# Patient Record
Sex: FEMALE | Race: BLACK OR AFRICAN AMERICAN | Employment: UNEMPLOYED | URBAN - METROPOLITAN AREA
[De-identification: names, ages, dates, MRNs, and addresses within clinical notes are randomized per-mention and may not be internally consistent; named-entity substitution may affect disease eponyms.]

---

## 2017-08-11 ENCOUNTER — HOSPITAL ENCOUNTER (OUTPATIENT)
Dept: MAMMOGRAPHY | Age: 57
Discharge: HOME | End: 2017-08-11
Attending: INTERNAL MEDICINE | Admitting: INTERNAL MEDICINE

## 2017-08-11 DIAGNOSIS — Z12.31 VISIT FOR SCREENING MAMMOGRAM: ICD-10-CM

## 2017-08-24 ENCOUNTER — OFFICE VISIT (OUTPATIENT)
Dept: ORTHOPEDIC SURGERY | Age: 57
End: 2017-08-24

## 2017-08-24 VITALS
DIASTOLIC BLOOD PRESSURE: 80 MMHG | WEIGHT: 167 LBS | HEART RATE: 62 BPM | SYSTOLIC BLOOD PRESSURE: 127 MMHG | BODY MASS INDEX: 28.51 KG/M2 | HEIGHT: 64 IN

## 2017-08-24 DIAGNOSIS — S61.412A LACERATION OF LEFT HAND WITHOUT FOREIGN BODY, INITIAL ENCOUNTER: Primary | ICD-10-CM

## 2017-08-24 PROCEDURE — 99203 OFFICE O/P NEW LOW 30 MIN: CPT | Performed by: ORTHOPAEDIC SURGERY

## 2017-09-18 ENCOUNTER — OFFICE VISIT (OUTPATIENT)
Dept: ORTHOPEDIC SURGERY | Age: 57
End: 2017-09-18

## 2017-09-18 VITALS — BODY MASS INDEX: 28.51 KG/M2 | HEIGHT: 64 IN | WEIGHT: 167 LBS | RESPIRATION RATE: 16 BRPM

## 2017-09-18 DIAGNOSIS — S61.412A LACERATION OF LEFT HAND WITHOUT FOREIGN BODY, INITIAL ENCOUNTER: Primary | ICD-10-CM

## 2017-09-18 PROCEDURE — 99212 OFFICE O/P EST SF 10 MIN: CPT | Performed by: ORTHOPAEDIC SURGERY

## 2021-11-01 NOTE — PROGRESS NOTES
Patient not reached. Preop instructions left on voice mail. [id number]_[phone]______________    -Date_11/03/21______time_0915______arrival______0745 MASC______  -Nothing to eat or drink after midnight  -Responsible adult 25 or older to stay on site while you are here and drive you home and stay with you after  -Follow any instructions your doctors office has given you  -Bring a complete list of all your medications and supplements  -If you normally take the following medications in the morning please do so with a small    sip of water-heart,blood pressure,seizure,breathing or thyroid-avoid water pilll Do not take blood pressure medications ending in \"luther\" or \"pril\" the AM of surgery or the nhung prior  -You may use your inhalers  -Take half of your normal dose of any long acting insulins the night before-do not take    any diabetic medications in the morning  -Follow your doctors instructions regarding blood thinners  -Any questions call your surgeons office      COVID TEST        __X_ Covid test to be done 3-5 days prior to scheduled surgery -patient aware they are REQUIRED to bring a copy of the negative test result DOS-if they receive a positive result to notify their surgeon          If known-indicate where patient is getting test done          ________________________________________    ___ Rapid - DOS    ___ Other _____________________________      [facility] POLICY(subject to change)    There is a one visitor policy at [facility] for all surgeries and endoscopies. Whether the visitor can stay or will be asked to wait in the car will depend on the current policy and if social distancing can be maintained. The policy is subject to change at any time. Please make sure the visitor has a cell phone that is on,charged and able to accept calls, as this may be the way that the staff communicates with them. Pain management is NO VISITOR policyThe patients ride is expected to remain in the car with a cell phone for communication. If the ride is leaving the hospital grounds please make sure they are back in time for pickup. Have the patient inform the staff on arrival what their rides plans are while the patient is in the facility. At the MAIN there is one visitor allowed. Please note that the visitor policy is subject to change.

## 2021-11-03 ENCOUNTER — HOSPITAL ENCOUNTER (OUTPATIENT)
Age: 61
Setting detail: OUTPATIENT SURGERY
Discharge: HOME | End: 2021-11-03
Attending: PODIATRIST | Admitting: PODIATRIST
Payer: COMMERCIAL

## 2021-11-03 ENCOUNTER — ANESTHESIA EVENT (OUTPATIENT)
Dept: OPERATING ROOM | Age: 61
End: 2021-11-03
Payer: COMMERCIAL

## 2021-11-03 ENCOUNTER — ANESTHESIA (OUTPATIENT)
Dept: OPERATING ROOM | Age: 61
End: 2021-11-03
Payer: COMMERCIAL

## 2021-11-03 ENCOUNTER — APPOINTMENT (OUTPATIENT)
Dept: GENERAL RADIOLOGY | Age: 61
End: 2021-11-03
Attending: PODIATRIST
Payer: COMMERCIAL

## 2021-11-03 VITALS
RESPIRATION RATE: 6 BRPM | TEMPERATURE: 97.3 F | SYSTOLIC BLOOD PRESSURE: 107 MMHG | OXYGEN SATURATION: 100 % | DIASTOLIC BLOOD PRESSURE: 69 MMHG

## 2021-11-03 VITALS
DIASTOLIC BLOOD PRESSURE: 105 MMHG | BODY MASS INDEX: 27.14 KG/M2 | WEIGHT: 159 LBS | TEMPERATURE: 98 F | HEIGHT: 64 IN | HEART RATE: 88 BPM | SYSTOLIC BLOOD PRESSURE: 163 MMHG | OXYGEN SATURATION: 93 % | RESPIRATION RATE: 16 BRPM

## 2021-11-03 PROBLEM — G80.9 CEREBRAL PALSY (HCC): Status: ACTIVE | Noted: 2021-11-03

## 2021-11-03 PROCEDURE — 73620 X-RAY EXAM OF FOOT: CPT

## 2021-11-03 PROCEDURE — 6360000002 HC RX W HCPCS: Performed by: PODIATRIST

## 2021-11-03 PROCEDURE — 2709999900 HC NON-CHARGEABLE SUPPLY: Performed by: PODIATRIST

## 2021-11-03 PROCEDURE — 7100000011 HC PHASE II RECOVERY - ADDTL 15 MIN: Performed by: PODIATRIST

## 2021-11-03 PROCEDURE — C1713 ANCHOR/SCREW BN/BN,TIS/BN: HCPCS | Performed by: PODIATRIST

## 2021-11-03 PROCEDURE — 2580000003 HC RX 258: Performed by: PODIATRIST

## 2021-11-03 PROCEDURE — 6360000002 HC RX W HCPCS: Performed by: NURSE ANESTHETIST, CERTIFIED REGISTERED

## 2021-11-03 PROCEDURE — C9359 IMPLNT,BON VOID FILLER-PUTTY: HCPCS | Performed by: PODIATRIST

## 2021-11-03 PROCEDURE — 6360000002 HC RX W HCPCS: Performed by: ANESTHESIOLOGY

## 2021-11-03 PROCEDURE — 7100000000 HC PACU RECOVERY - FIRST 15 MIN: Performed by: PODIATRIST

## 2021-11-03 PROCEDURE — 2720000010 HC SURG SUPPLY STERILE: Performed by: PODIATRIST

## 2021-11-03 PROCEDURE — 3700000000 HC ANESTHESIA ATTENDED CARE: Performed by: PODIATRIST

## 2021-11-03 PROCEDURE — 3700000001 HC ADD 15 MINUTES (ANESTHESIA): Performed by: PODIATRIST

## 2021-11-03 PROCEDURE — 64445 NJX AA&/STRD SCIATIC NRV IMG: CPT | Performed by: ANESTHESIOLOGY

## 2021-11-03 PROCEDURE — 3600000014 HC SURGERY LEVEL 4 ADDTL 15MIN: Performed by: PODIATRIST

## 2021-11-03 PROCEDURE — 7100000001 HC PACU RECOVERY - ADDTL 15 MIN: Performed by: PODIATRIST

## 2021-11-03 PROCEDURE — 3600000004 HC SURGERY LEVEL 4 BASE: Performed by: PODIATRIST

## 2021-11-03 PROCEDURE — 7100000010 HC PHASE II RECOVERY - FIRST 15 MIN: Performed by: PODIATRIST

## 2021-11-03 PROCEDURE — 2500000003 HC RX 250 WO HCPCS: Performed by: PODIATRIST

## 2021-11-03 PROCEDURE — 3209999900 FLUORO FOR SURGICAL PROCEDURES

## 2021-11-03 PROCEDURE — 2500000003 HC RX 250 WO HCPCS: Performed by: NURSE ANESTHETIST, CERTIFIED REGISTERED

## 2021-11-03 DEVICE — 3.5 X 14 MM R3CON LOCKING PLATE SCREW
Type: IMPLANTABLE DEVICE | Site: TOES | Status: FUNCTIONAL
Brand: GORILLA PLATING SYSTEM

## 2021-11-03 DEVICE — 3.5 X 16 MM R3CON NON-LOCKING PLATE SCREW
Type: IMPLANTABLE DEVICE | Site: TOES | Status: FUNCTIONAL
Brand: GORILLA PLATING SYSTEM

## 2021-11-03 DEVICE — GRAFT BNE 5ML DEMIN BNE MTRX PTY INTERGRO: Type: IMPLANTABLE DEVICE | Site: TOES | Status: FUNCTIONAL

## 2021-11-03 DEVICE — MTP, 0 DEGREE, MEDIUM PLATE, RIGHT
Type: IMPLANTABLE DEVICE | Site: TOES | Status: FUNCTIONAL
Brand: GORILLA PLATING SYSTEM

## 2021-11-03 DEVICE — 3.5 X 10 MM R3CON LOCKING PLATE SCREW
Type: IMPLANTABLE DEVICE | Site: TOES | Status: FUNCTIONAL
Brand: GORILLA PLATING SYSTEM

## 2021-11-03 RX ORDER — KETOROLAC TROMETHAMINE 30 MG/ML
INJECTION, SOLUTION INTRAMUSCULAR; INTRAVENOUS PRN
Status: DISCONTINUED | OUTPATIENT
Start: 2021-11-03 | End: 2021-11-03 | Stop reason: SDUPTHER

## 2021-11-03 RX ORDER — MEPERIDINE HYDROCHLORIDE 25 MG/ML
12.5 INJECTION INTRAMUSCULAR; INTRAVENOUS; SUBCUTANEOUS EVERY 5 MIN PRN
Status: DISCONTINUED | OUTPATIENT
Start: 2021-11-03 | End: 2021-11-03 | Stop reason: HOSPADM

## 2021-11-03 RX ORDER — SERTRALINE HYDROCHLORIDE 100 MG/1
100 TABLET, FILM COATED ORAL DAILY
COMMUNITY

## 2021-11-03 RX ORDER — PHENYLEPHRINE HCL IN 0.9% NACL 1 MG/10 ML
SYRINGE (ML) INTRAVENOUS PRN
Status: DISCONTINUED | OUTPATIENT
Start: 2021-11-03 | End: 2021-11-03 | Stop reason: SDUPTHER

## 2021-11-03 RX ORDER — ONDANSETRON 2 MG/ML
4 INJECTION INTRAMUSCULAR; INTRAVENOUS
Status: DISCONTINUED | OUTPATIENT
Start: 2021-11-03 | End: 2021-11-03 | Stop reason: HOSPADM

## 2021-11-03 RX ORDER — LABETALOL HYDROCHLORIDE 5 MG/ML
5 INJECTION, SOLUTION INTRAVENOUS EVERY 10 MIN PRN
Status: DISCONTINUED | OUTPATIENT
Start: 2021-11-03 | End: 2021-11-03 | Stop reason: HOSPADM

## 2021-11-03 RX ORDER — ROPIVACAINE HYDROCHLORIDE 5 MG/ML
INJECTION, SOLUTION EPIDURAL; INFILTRATION; PERINEURAL
Status: COMPLETED | OUTPATIENT
Start: 2021-11-03 | End: 2021-11-03

## 2021-11-03 RX ORDER — ATORVASTATIN CALCIUM 80 MG/1
80 TABLET, FILM COATED ORAL DAILY
COMMUNITY

## 2021-11-03 RX ORDER — FENTANYL CITRATE 50 UG/ML
50 INJECTION, SOLUTION INTRAMUSCULAR; INTRAVENOUS ONCE
Status: COMPLETED | OUTPATIENT
Start: 2021-11-03 | End: 2021-11-03

## 2021-11-03 RX ORDER — ACETAMINOPHEN 650 MG
TABLET, EXTENDED RELEASE ORAL
Status: COMPLETED | OUTPATIENT
Start: 2021-11-03 | End: 2021-11-03

## 2021-11-03 RX ORDER — LIDOCAINE HYDROCHLORIDE 20 MG/ML
INJECTION, SOLUTION EPIDURAL; INFILTRATION; INTRACAUDAL; PERINEURAL PRN
Status: DISCONTINUED | OUTPATIENT
Start: 2021-11-03 | End: 2021-11-03 | Stop reason: SDUPTHER

## 2021-11-03 RX ORDER — SODIUM CHLORIDE 9 MG/ML
INJECTION, SOLUTION INTRAVENOUS CONTINUOUS
Status: DISCONTINUED | OUTPATIENT
Start: 2021-11-03 | End: 2021-11-03 | Stop reason: HOSPADM

## 2021-11-03 RX ORDER — PROPOFOL 10 MG/ML
INJECTION, EMULSION INTRAVENOUS PRN
Status: DISCONTINUED | OUTPATIENT
Start: 2021-11-03 | End: 2021-11-03 | Stop reason: SDUPTHER

## 2021-11-03 RX ORDER — PROMETHAZINE HYDROCHLORIDE 25 MG/ML
6.25 INJECTION, SOLUTION INTRAMUSCULAR; INTRAVENOUS
Status: DISCONTINUED | OUTPATIENT
Start: 2021-11-03 | End: 2021-11-03 | Stop reason: HOSPADM

## 2021-11-03 RX ORDER — MAGNESIUM SULFATE HEPTAHYDRATE 500 MG/ML
INJECTION, SOLUTION INTRAMUSCULAR; INTRAVENOUS PRN
Status: DISCONTINUED | OUTPATIENT
Start: 2021-11-03 | End: 2021-11-03 | Stop reason: SDUPTHER

## 2021-11-03 RX ORDER — LEVOTHYROXINE SODIUM 0.12 MG/1
125 TABLET ORAL DAILY
COMMUNITY

## 2021-11-03 RX ORDER — MIDAZOLAM HYDROCHLORIDE 2 MG/2ML
1 INJECTION, SOLUTION INTRAMUSCULAR; INTRAVENOUS ONCE
Status: COMPLETED | OUTPATIENT
Start: 2021-11-03 | End: 2021-11-03

## 2021-11-03 RX ORDER — BUPIVACAINE HYDROCHLORIDE 5 MG/ML
INJECTION, SOLUTION EPIDURAL; INTRACAUDAL
Status: COMPLETED | OUTPATIENT
Start: 2021-11-03 | End: 2021-11-03

## 2021-11-03 RX ORDER — GLYCOPYRROLATE 1 MG/5 ML
SYRINGE (ML) INTRAVENOUS PRN
Status: DISCONTINUED | OUTPATIENT
Start: 2021-11-03 | End: 2021-11-03 | Stop reason: SDUPTHER

## 2021-11-03 RX ORDER — ONDANSETRON 2 MG/ML
INJECTION INTRAMUSCULAR; INTRAVENOUS PRN
Status: DISCONTINUED | OUTPATIENT
Start: 2021-11-03 | End: 2021-11-03 | Stop reason: SDUPTHER

## 2021-11-03 RX ORDER — LOSARTAN POTASSIUM 50 MG/1
50 TABLET ORAL DAILY
COMMUNITY

## 2021-11-03 RX ORDER — NIFEDIPINE 30 MG/1
60 TABLET, FILM COATED, EXTENDED RELEASE ORAL DAILY
COMMUNITY

## 2021-11-03 RX ORDER — HYDROMORPHONE HCL 110MG/55ML
PATIENT CONTROLLED ANALGESIA SYRINGE INTRAVENOUS PRN
Status: DISCONTINUED | OUTPATIENT
Start: 2021-11-03 | End: 2021-11-03 | Stop reason: SDUPTHER

## 2021-11-03 RX ORDER — HYDROMORPHONE HCL 110MG/55ML
0.25 PATIENT CONTROLLED ANALGESIA SYRINGE INTRAVENOUS EVERY 5 MIN PRN
Status: DISCONTINUED | OUTPATIENT
Start: 2021-11-03 | End: 2021-11-03 | Stop reason: HOSPADM

## 2021-11-03 RX ORDER — 0.9 % SODIUM CHLORIDE 0.9 %
500 INTRAVENOUS SOLUTION INTRAVENOUS
Status: DISCONTINUED | OUTPATIENT
Start: 2021-11-03 | End: 2021-11-03 | Stop reason: HOSPADM

## 2021-11-03 RX ORDER — HYDROMORPHONE HCL 110MG/55ML
0.5 PATIENT CONTROLLED ANALGESIA SYRINGE INTRAVENOUS EVERY 5 MIN PRN
Status: DISCONTINUED | OUTPATIENT
Start: 2021-11-03 | End: 2021-11-03 | Stop reason: HOSPADM

## 2021-11-03 RX ORDER — DEXAMETHASONE SODIUM PHOSPHATE 4 MG/ML
INJECTION, SOLUTION INTRA-ARTICULAR; INTRALESIONAL; INTRAMUSCULAR; INTRAVENOUS; SOFT TISSUE PRN
Status: DISCONTINUED | OUTPATIENT
Start: 2021-11-03 | End: 2021-11-03 | Stop reason: SDUPTHER

## 2021-11-03 RX ORDER — HYDROCHLOROTHIAZIDE 25 MG/1
25 TABLET ORAL DAILY
COMMUNITY

## 2021-11-03 RX ADMIN — HYDROMORPHONE HYDROCHLORIDE 0.5 MG: 2 INJECTION, SOLUTION INTRAMUSCULAR; INTRAVENOUS; SUBCUTANEOUS at 09:39

## 2021-11-03 RX ADMIN — PROPOFOL 120 MG: 10 INJECTION, EMULSION INTRAVENOUS at 09:33

## 2021-11-03 RX ADMIN — MIDAZOLAM 1 MG: 1 INJECTION INTRAMUSCULAR; INTRAVENOUS at 08:42

## 2021-11-03 RX ADMIN — FENTANYL CITRATE 50 MCG: 50 INJECTION INTRAMUSCULAR; INTRAVENOUS at 08:41

## 2021-11-03 RX ADMIN — Medication 0.2 MG: at 09:33

## 2021-11-03 RX ADMIN — MAGNESIUM SULFATE HEPTAHYDRATE 1 G: 500 INJECTION, SOLUTION INTRAMUSCULAR; INTRAVENOUS at 09:38

## 2021-11-03 RX ADMIN — Medication 100 MCG: at 10:13

## 2021-11-03 RX ADMIN — DEXAMETHASONE SODIUM PHOSPHATE 8 MG: 4 INJECTION, SOLUTION INTRAMUSCULAR; INTRAVENOUS at 09:38

## 2021-11-03 RX ADMIN — CEFAZOLIN 2000 MG: 10 INJECTION, POWDER, FOR SOLUTION INTRAVENOUS at 09:29

## 2021-11-03 RX ADMIN — Medication 200 MCG: at 09:52

## 2021-11-03 RX ADMIN — LIDOCAINE HYDROCHLORIDE 50 MG: 20 INJECTION, SOLUTION EPIDURAL; INFILTRATION; INTRACAUDAL; PERINEURAL at 09:33

## 2021-11-03 RX ADMIN — SODIUM CHLORIDE: 9 INJECTION, SOLUTION INTRAVENOUS at 10:26

## 2021-11-03 RX ADMIN — SODIUM CHLORIDE: 9 INJECTION, SOLUTION INTRAVENOUS at 08:16

## 2021-11-03 RX ADMIN — KETOROLAC TROMETHAMINE 30 MG: 60 INJECTION, SOLUTION INTRAMUSCULAR at 10:21

## 2021-11-03 RX ADMIN — ROPIVACAINE HYDROCHLORIDE 20 ML: 5 INJECTION, SOLUTION EPIDURAL; INFILTRATION; PERINEURAL at 08:50

## 2021-11-03 RX ADMIN — ONDANSETRON 4 MG: 2 INJECTION INTRAMUSCULAR; INTRAVENOUS at 10:21

## 2021-11-03 ASSESSMENT — PULMONARY FUNCTION TESTS
PIF_VALUE: 16
PIF_VALUE: 1
PIF_VALUE: 16
PIF_VALUE: 17
PIF_VALUE: 16
PIF_VALUE: 0
PIF_VALUE: 20
PIF_VALUE: 16
PIF_VALUE: 17
PIF_VALUE: 16
PIF_VALUE: 17
PIF_VALUE: 11
PIF_VALUE: 17
PIF_VALUE: 0
PIF_VALUE: 1
PIF_VALUE: 16
PIF_VALUE: 18
PIF_VALUE: 16
PIF_VALUE: 1
PIF_VALUE: 16
PIF_VALUE: 0
PIF_VALUE: 16
PIF_VALUE: 17
PIF_VALUE: 16
PIF_VALUE: 17
PIF_VALUE: 16
PIF_VALUE: 17
PIF_VALUE: 16
PIF_VALUE: 11
PIF_VALUE: 17
PIF_VALUE: 4
PIF_VALUE: 17
PIF_VALUE: 16
PIF_VALUE: 17
PIF_VALUE: 16
PIF_VALUE: 17

## 2021-11-03 ASSESSMENT — PAIN - FUNCTIONAL ASSESSMENT: PAIN_FUNCTIONAL_ASSESSMENT: 0-10

## 2021-11-03 NOTE — PROGRESS NOTES
Pt discharged via wheel chair in stable condition to sister to be transported home. Pt reports no pain or nausea , VSS.

## 2021-11-03 NOTE — ANESTHESIA PROCEDURE NOTES
Peripheral Block    Patient location during procedure: post-op  Start time: 11/3/2021 8:44 AM  End time: 11/3/2021 8:45 AM  Staffing  Performed: anesthesiologist   Anesthesiologist: Cruz Dorantes DO  Preanesthetic Checklist  Completed: patient identified, IV checked, site marked, risks and benefits discussed, surgical consent, monitors and equipment checked, pre-op evaluation, timeout performed, anesthesia consent given, oxygen available and patient being monitored  Peripheral Block  Patient position: supine  Prep: ChloraPrep  Patient monitoring: cardiac monitor, continuous pulse ox, frequent blood pressure checks and IV access  Block type: Sciatic  Laterality: right  Injection technique: single-shot  Guidance: ultrasound guided  Popliteal  Provider prep: mask and sterile gloves  Needle  Needle type: short-bevel   Needle gauge: 22 G  Needle length: 8 cm  Needle localization: ultrasound guidance  Test dose: negative  Assessment  Injection assessment: negative aspiration for heme, no paresthesia on injection and local visualized surrounding nerve on ultrasound  Paresthesia pain: none  Slow fractionated injection: yes  Hemodynamics: stable  Medications Administered  Ropivacaine (NAROPIN) injection 0.5%, 20 mL  Reason for block: post-op pain management

## 2021-11-03 NOTE — PROGRESS NOTES
Pt assisted with dressing and able to ambulate with walker and remain non weight bearing. Right toes warm, pink and less 3 second cap refill, pt reports her right leg remains numb at this time, dressing clean dry and intact. Pt assisted to bathroom via wheel chair to void.

## 2021-11-03 NOTE — ANESTHESIA PRE PROCEDURE
Department of Anesthesiology  Preprocedure Note       Name:  García Kaur   Age:  59 y.o.  :  1960                                          MRN:  [id number]         Date:  11/3/2021      Surgeon: Mina Norwood):  Riya Roach DPM    Procedure: Procedure(s):  RIGHT FOOT FUSION FIRST METATARSAL PHALANGEAL JOINT    Medications prior to admission:   Prior to Admission medications    Medication Sig Start Date End Date Taking?  Authorizing Provider   atorvastatin (LIPITOR) 80 MG tablet Take 80 mg by mouth daily   Yes Historical Provider, MD   hydroCHLOROthiazide (HYDRODIURIL) 25 MG tablet Take 25 mg by mouth daily   Yes Historical Provider, MD   levothyroxine (SYNTHROID) 125 MCG tablet Take 125 mcg by mouth Daily   Yes Historical Provider, MD   losartan (COZAAR) 50 MG tablet Take 50 mg by mouth daily   Yes Historical Provider, MD   NIFEdipine (ADALAT CC) 30 MG extended release tablet Take 60 mg by mouth daily   Yes Historical Provider, MD   sertraline (ZOLOFT) 100 MG tablet Take 100 mg by mouth daily   Yes Historical Provider, MD   UNKNOWN TO PATIENT Patient states she takes blood pressure, thyroid, anxiety and depression medicine but does not know the names   Yes Historical Provider, MD       Current medications:    Current Facility-Administered Medications   Medication Dose Route Frequency Provider Last Rate Last Admin    ceFAZolin (ANCEF) 2000 mg in dextrose 5 % 50 mL IVPB  2,000 mg IntraVENous On Call to OR [employer], DPM        0.9 % sodium chloride infusion   IntraVENous Continuous Lj Gregory V, DPM           Allergies:  No Known Allergies    Problem List:    Patient Active Problem List   Diagnosis Code    Laceration of left hand without foreign body S61.412A       Past Medical History:        Diagnosis Date    Cerebral palsy ([facility] [address]     Hypertension     Thyroid disease        Past Surgical History:        Procedure Laterality Date    EYE SURGERY         Social History:    Social History Tobacco Use    Smoking status: Former Smoker    Smokeless tobacco: Never Used   Substance Use Topics    Alcohol use: Yes                                Counseling given: Not Answered      Vital Signs (Current):   Vitals:    11/03/21 0750 11/03/21 0803   BP:  (!) 172/89   Pulse:  72   Resp:  16   Temp: 97.7 °F (36.5 °C)    TempSrc: Temporal    SpO2:  99%   Weight: 159 lb (72.1 kg)    Height: 5' 4\" (1.626 m)                                               BP Readings from Last 3 Encounters:   11/03/21 (!) 172/89   08/24/17 127/80   08/18/17 127/80       NPO Status: Time of last liquid consumption: 2100                        Time of last solid consumption: 2100                        Date of last liquid consumption: 11/02/21                        Date of last solid food consumption: 11/02/21    BMI:   Wt Readings from Last 3 Encounters:   11/03/21 159 lb (72.1 kg)   09/18/17 167 lb (75.8 kg)   08/24/17 167 lb (75.8 kg)     Body mass index is 27.29 kg/m². CBC: No results found for: WBC, RBC, HGB, HCT, MCV, RDW, PLT    CMP: No results found for: NA, K, CL, CO2, BUN, CREATININE, GFRAA, AGRATIO, LABGLOM, GLUCOSE, PROT, CALCIUM, BILITOT, ALKPHOS, AST, ALT    POC Tests: No results for input(s): POCGLU, POCNA, POCK, POCCL, POCBUN, POCHEMO, POCHCT in the last 72 hours.     Coags: No results found for: PROTIME, INR, APTT    HCG (If Applicable): No results found for: PREGTESTUR, PREGSERUM, HCG, HCGQUANT     ABGs: No results found for: PHART, PO2ART, SGX8WTP, KBR0MTV, BEART, A5EEQXWB     Type & Screen (If Applicable):  No results found for: LABABO, LABRH    Drug/Infectious Status (If Applicable):  No results found for: HIV, HEPCAB    COVID-19 Screening (If Applicable): No results found for: COVID19        Anesthesia Evaluation  Patient summary reviewed  Airway: Mallampati: III  TM distance: <3 FB   Neck ROM: full  Mouth opening: > = 3 FB Dental:          Pulmonary:normal exam  breath sounds clear to auscultation  (+) sleep apnea:                             Cardiovascular:  Exercise tolerance: good (>4 METS),   (+) hypertension:, hyperlipidemia        Rhythm: regular  Rate: normal                    Neuro/Psych:   (+) neuromuscular disease:,             GI/Hepatic/Renal: Neg GI/Hepatic/Renal ROS            Endo/Other:    (+) hypothyroidism::., .                 Abdominal:       Abdomen: soft. Vascular: negative vascular ROS. Other Findings:             Anesthesia Plan      general     ASA 3       Induction: intravenous. MIPS: Postoperative opioids intended and Prophylactic antiemetics administered. Anesthetic plan and risks discussed with patient. Use of blood products discussed with patient whom consented to blood products. Plan discussed with CRNA.     Attending anesthesiologist reviewed and agrees with Preprocedure content              Oscar Nava DO   11/3/2021

## 2021-11-03 NOTE — PROGRESS NOTES
Pt arrived from OR, report from crna/rn. Pt had right foot surgery, ace wrap/ splint dressing in place, clean dry and intact. Right toes warm, pink and good cap refill, pt reports her toes are numb due to nerve block. Right leg elevated and ice pack placed behind right knee. Pt awake and alert, respirations even unlabored, nasal cannula 3 liters in place.

## 2021-11-03 NOTE — H&P
[facility]   [facility]ISTORY AND PHYSICAL      I am seeing Kenyetta Baxter at the request of Dr Nanette Sarmiento   for evaluation of the patient's medical problems prior to Newman Peter PHALANGEAL JOINT    11/3/2021 8:48 AM    Patient Information:  Kenyetta Baxter is a 59 y.o. female [id number]  PCP:  Tiarra Michele MD (Tel: [phone] )    Chief complaint:  Right foot pain    History of Present Illness:  Breann Bush is a 59 y.o. female who presented with right foot pain. Symptom onset was gradual for a time period of few months. The severity is described as mild-moderate. The course of her symptoms over time is chronic. The symptoms improved with rest and worsened with activity. The patient's symptom is associated with none. History obtained from patient, limited historian. Hx cerebral palsy with right arm weakness, GERD, HTN, Grave's disease s/p radioactive iodine (2006), MEAGAN (untreated). Denies history of heart or lung disease. Reports she is able to climb a flight of steps in home without stopping to rest, chest pain or shortness of breath. Denies recent illness. REVIEW OF SYSTEMS:   Constitutional:  Negative for fever,chills or night sweats  ENT:  Negative for rhinorrhea, epistaxis, hoarseness, sore throat. Respiratory:   Negative for shortness of breath,wheezing  Cardiovascular:   Negative for  chest pain, palpitations   Gastrointestinal:  Negative for nausea, vomiting, diarrhea  Genitourinary:  Negative for polyuria, dysuria   Hematologic/Lymphatic:  Negative for  bleeding tendency, easy bruising  Musculoskeletal:  Negative for myalgias and arthralgias, + right foot pain  Neurologic:  Negative for  confusion,dysarthria. Skin:  Negative for itching,rash  Psychiatric:  Negative for depression,anxiety, agitation. Endocrine:  Negative for polydipsia,polyuria,heat /cold intolerance.     Past place and person. No speech or motor deficits  Psychiatry: Appropriate affect. Not agitated  Skin: Warm, dry, normal turgor, no rash    Labs:  CBC 10/21/2021 in [facility]. Hgb 12.6, Hct 37.5%, platelets 706  BMP 88/69/5118 in Care Everywhere: BUN 10, creat 1.0, Na 141, K 3.3:    :    I visualized EKG strips: sinus rhythm    Discussed hypokalemia with nursing and anesthesia. Problem List  Active Problems:    Cerebral palsy ([address]  Resolved Problems:    * No resolved hospital problems. *        Assessment & Recommendation:     1. Preop evaluation. Patient is medically optimized for surgery. 2. HTN. Elevated this am. It appears she takes nifedipine, losartan and HCTZ. 3. Thyroid disease. Hx Grave's disease s/p radioactive iodine. Takes levothyroxine. 4. Macrocytic anemia. Hgb and Hct stable on recent labs. 5. Hypokalemia. Potassium 3.3 on recent labs. Per patient she was told by PCP to increase intake potassium rich foods. .  6. Hx MEAGAN. Untreated. Thank you for the opportunity to participate in the care of your patient.   Gabino Garces, TERE - CNP    11/3/2021 8:48 AM

## 2021-11-03 NOTE — PROGRESS NOTES
Pt awake and alert, no complaints of pain or nausea. Right leg dressing/ splint clean dry and intact. VSS. Pt moved to phase 2.

## 2021-11-03 NOTE — PROGRESS NOTES
Dr Romy Quach    Pre op: Hallux rigidus right foot, DJD right foot, Bone spur right foot    Post op: same    Procedure: Fusion 1st mtp joint right foot with internal fixation right foot    Anesthesia: General    Complications: None noted    Ebl: less than 10 cc

## 2021-11-03 NOTE — PROGRESS NOTES
Time out done, 02 on @ 2 l/min per n/c, then versed & fentanyl IV given, then Dr John Bolanos completed right popliteal nerve block, patient tolerated procedure well.

## 2021-11-04 NOTE — OP NOTE
[place] [address]                     [facility], [address] [place] [address]                                OPERATIVE REPORT    PATIENT NAME: Duc Robertson                     :        1960  MED REC NO:   [id number]                          ROOM:  ACCOUNT NO:   [de-identified]                           ADMIT DATE: 2021  PROVIDER:     Shukri Ricardo DPM    DATE OF PROCEDURE:  2021    PREOPERATIVE DIAGNOSES:  1. Hallux rigidus, right foot. 2.  Degenerative joint disease, first MTP joint, right foot. 3.  Bone spur, right foot. 4.  Pain. POSTOPERATIVE DIAGNOSES:  1. Hallux rigidus, right foot. 2.  Degenerative joint disease, first MTP joint, right foot. 3.  Bone spur, right foot. 4.  Pain. OPERATION PERFORMED:  Fusion, first MTP joint, right foot with internal  fixation. SURGEON:  Shukri Ricardo DPM    ANESTHESIA:  General.    COMPLICATIONS:  None noted. ESTIMATED BLOOD LOSS:  Less than 10 mL. PROCEDURE IN DETAIL:  Under mild sedation, the patient was brought into  the operating room and placed on the operating table in the supine  position. The patient was placed under general anesthesia. Ankle cuff  was then placed about the patient's right ankle with excessive cast  padding. After this was done, a local injection of local anesthesia was  performed at the base of the first metatarsal utilizing 10 mL of 2%  lidocaine plain. Foot and lower leg were then scrubbed, prepped, and  draped in the usual aseptic manner. An Esmarch bandage was then  utilized to exsanguinate the patient's right foot and the pneumatic  ankle cuff was inflated to 250 mmHg, right. Attention was then directed to the dorsal aspect of the first MTP joint  of the right foot where a 7 cm linear longitudinal incision was made  directly over the dorsal aspect of the first MTP joint of the right  foot. An incision of 7 cm was made medial and parallel to the EHL  tendon. Dissection was then continued all the way down through the  subcutaneous tissues with care being taken to identify and retract all  vital neural and vascular structures. All bleeders were cauterized and  ligated as necessary. Next, dissection was carried all the way down  through the subcutaneous tissues. Incision was made through the  periosteal and capsular structures. Once this was done, this exposed  the proximal phalanx and first metatarsal.  There was significant bone  growth and bone spurring noted over the dorsal, medial, and lateral  aspects of the first MTP joint. Upon opening the joint at this time,  there was a complete denudation of cartilage with no further cartilage  remaining on either surface of the first metatarsal head or proximal  phalanx. At this time utilizing a 138 saw blade, we removed bone  spurring from the medial, dorsal, and lateral aspects of the first  metatarsal head and proximal phalanx. We took a power reciprocating  rasp and contoured the edges cylindrically all the way around this  particular region. Next, we brought in the cup and cone reamers from  [place] [id number]. At this time, we placed a wire down the central aspect of  the shaft of the first metatarsal and utilized a size 21 cone over top  to cylindrically shape to create a ball and socket joint. Power  reciprocating rasp was then lightly touched up around the medial and  lateral sides just to make it, so there was no rough edges and it was a  nice cylindrical shape. Wire was then removed in total.  Wire was then  transported into the proximal phalanx and then set on the midshaft area  and then the cone was then utilized, size 21. We took a rongeur and  removed all the dead bone around the dorsal, medial, and lateral  aspects, power reciprocating rasp to contour this area was also  performed as well. After this was done, we flushed with copious amounts  of sterile normal saline.   We took a fenestration drill guide and  created multiple fenestration drill guide holes in the first metatarsal  head and base of the proximal phalanx. After this was done, the area  was then flushed one more time with copious amounts of sterile normal  saline, a PulsaVac lavage with 3000 mL. Foot plate was then brought  into play, placed in the bottom portion of the foot. Toe was then  placed into rectus position with a 2 degree valgus angle, 2 degree of  dorsiflexion. Wire was then driven across the first metatarsal into the  proximal phalanx base for temporary fixation and then utilizing standard  AO principles and techniques, an MTP plate 0 degree right medium was  then brought into play. We first utilized three 3.5 x 10 mm Recon  locking screws in the proximal phalanx. We then utilized a 3.5 x 16 mm  Recon nonlocking screw for the compression hole, which compressed the  first MTP joint together in excellent position. Next, we placed two 3.5  x 14 mm screws in the first MTP plate and this allowed for excellent  realignment of the big toe joint in all three planes. There was  excellent bone-to-bone contact with no gapping. The area was flushed  with copious amount of sterile normal saline again. We placed bone  graft along the medial and lateral sides due to the severe cystic  formation of the first MTP joint to also help facilitate and accentuate  fusion of the first MTP joint. At this time, we trimmed some of the  dead synovitis tissue out of the periosteal layers and then we sew the  periosteum with 3-0 Vicryl, subcutaneous tissues with 4-0 Vicryl. These  were all done with simple interrupted suture technique. Then, Mastisol  was placed about the skin. Then, we used ZipLine suture and we pulled  the tags from medial to lateral to close the skin up in a nice everted  position. A total of 10 mL of 0.5% Marcaine plain was then infiltrated  about the incisional sites.   This was dressed with Betadine-soaked  Adaptic, sterile 4 x 4s, 4 x 8s, two rolls of 2-inch Mark, several  layers of fluffy cast padding, posterior splint along with multiple  layers of fluffy cast padding up into the posterior aspect of the knee  cylindrically around the leg. Posterior splint was then fitted and then  a 6-inch double Ace was then wrapped around this region with the foot  being held in 90 degrees dorsiflexion. Tourniquet was deflated and a  prompt hyperemic response was noted in all digits one through five of  the right foot. Following a period of postoperative monitoring, the patient was  discharged home on oral and written postop instructions per Dr. Darian London. The patient is to keep the dressing dry, clean, and intact,  elevate above the level of heart, place ice about this. The patient is  to remain strict nonweightbearing with crutch use. I will see her back  in the office for bandage change and recheck. She is to be  nonweightbearing with her knee scooter, which she has obtained. She is  to begin taking her antibiotics, pain pills, as well as her  anticoagulant medication, Xarelto. We have been cleared for all this  with office explanation and physicians are aware who take care of her  medically. We will see her back on Monday for bandage change and  recheck in our [facility] office.         Jersey Ramsey DPM    D: 11/03/2021 11:04:07       T: 11/03/2021 20:54:05     [email or web address]  Job#: [id number]     Doc#: [id number]    CC:  Darian London DPM

## 2021-11-15 ENCOUNTER — CLINICAL DOCUMENTATION (OUTPATIENT)
Dept: OTHER | Age: 61
End: 2021-11-15

## 2021-12-15 ENCOUNTER — CLINICAL DOCUMENTATION (OUTPATIENT)
Dept: OTHER | Age: 61
End: 2021-12-15

## (undated) DEVICE — SOLUTION IRRIG 3000ML 0.9% SOD CHL USP UROMATIC PLAS CONT

## (undated) DEVICE — C-ARM: Brand: UNBRANDED

## (undated) DEVICE — GLOVE SURG SZ 9 THK91MIL LTX FREE SYN POLYISOPRENE ANTI

## (undated) DEVICE — BANDAGE COMPR W6INXL10YD ST M E WHITE/BEIGE

## (undated) DEVICE — GOWN,AURORA,NONREINF,RAGLAN,XXL,STERILE: Brand: MEDLINE

## (undated) DEVICE — RASP SURG L W0.27XL0.55IN TEAR CRSS CUT MIC RECIP SAW

## (undated) DEVICE — SHEET,DRAPE,53X77,STERILE: Brand: MEDLINE

## (undated) DEVICE — P28, K-WIRE, 1.6 X 150 MM, SINGLE TROCAR, SMOOTH, SS
Type: IMPLANTABLE DEVICE | Site: TOES | Status: NON-FUNCTIONAL
Brand: MULTI SYSTEM
Removed: 2021-11-03

## (undated) DEVICE — SUTURE VCRL + SZ 4-0 L27IN ABSRB UD PS-2 3/8 CIR REV CUT VCP426H

## (undated) DEVICE — DRAPE,U/ SHT,SPLIT,PLAS,STERIL: Brand: MEDLINE

## (undated) DEVICE — GLOVE ORANGE PI 8 1/2   MSG9085

## (undated) DEVICE — HYPODERMIC SAFETY NEEDLE: Brand: MAGELLAN

## (undated) DEVICE — MAJOR SET UP PK

## (undated) DEVICE — HANDPIECE SET WITH HIGH FLOW TIP AND SUCTION TUBE: Brand: INTERPULSE

## (undated) DEVICE — SUTURE VCRL + SZ 3-0 L18IN ABSRB UD PS-2 3/8 CIR REV CUT VCP497H

## (undated) DEVICE — ZIP 8I SURGICAL SKIN CLOSURE DEVICE: Brand: ZIP 8I SURGICAL SKIN CLOSURE DEVICE

## (undated) DEVICE — GAUZE,SPONGE,4"X4",8PLY,STRL,LF,10/TRAY: Brand: MEDLINE

## (undated) DEVICE — PADDING CAST W4INXL4YD ST COT RAYON MICROPLEATED HIGHLY

## (undated) DEVICE — ELECTRODE NDL 2.8IN COAT VALLEYLAB

## (undated) DEVICE — K-WIRE, SINGLE ENDED TROCAR TIP, SMOOTH, 1.2 X 150MM
Type: IMPLANTABLE DEVICE | Site: TOES | Status: NON-FUNCTIONAL
Brand: MONSTER SCREW SYSTEM
Removed: 2021-11-03

## (undated) DEVICE — T-DRAPE,EXTREMITY,STERILE: Brand: MEDLINE

## (undated) DEVICE — TOWEL,OR,DSP,ST,BLUE,STD,4/PK,20PK/CS: Brand: MEDLINE

## (undated) DEVICE — BANDAGE COMPR W4INXL12FT E DISP ESMARCH EVEN

## (undated) DEVICE — SOLUTION IRRIG 500ML 0.9% SOD CHL USP POUR PLAS BTL

## (undated) DEVICE — SYRINGE, LUER LOCK, 10ML: Brand: MEDLINE

## (undated) DEVICE — 3 ML SYRINGE LUER-LOCK TIP: Brand: MONOJECT

## (undated) DEVICE — BANDAGE GZ W2XL75IN ST RAYON POLY CNFRM STRTCH LTWT

## (undated) DEVICE — Device

## (undated) DEVICE — YANKAUER SUCTION INSTRUMENT NO CONTROL VENT, BULB TIP, CLEAR: Brand: YANKAUER

## (undated) DEVICE — MICRO SAGITTAL BLADE (9.5 X 0.4 X 25.5MM)

## (undated) DEVICE — TRAY PREP DRY W/ PREM GLV 2 APPL 6 SPNG 2 UNDPD 1 OVERWRAP

## (undated) DEVICE — CURITY NON-ADHERENT STRIPS: Brand: CURITY

## (undated) DEVICE — DRESSING PETRO W3XL3IN OIL EMUL N ADH GZ KNIT IMPREG CELOS

## (undated) DEVICE — COVER,MAYO STAND,STERILE: Brand: MEDLINE

## (undated) DEVICE — PRECISION THIN (9.0 X 0.38 X 25.0MM)

## (undated) DEVICE — INTENDED FOR TISSUE SEPARATION, AND OTHER PROCEDURES THAT REQUIRE A SHARP SURGICAL BLADE TO PUNCTURE OR CUT.: Brand: BARD-PARKER ® STAINLESS STEEL BLADES

## (undated) DEVICE — SUTURE ABSORBABLE BRAIDED 3-0 12X18 IN COAT UD VICRYL + VCP110G

## (undated) DEVICE — STOCKINETTE CAST W6XL48IN WHT POLY IMPERV PRECUT SYN DBL

## (undated) DEVICE — BONE FENESTRATION PERFORATOR: Brand: BABY GORILLA®/GORILLA® PLATING SYSTEM

## (undated) DEVICE — OLIVE WIRE, THREADED, 1.4MM
Type: IMPLANTABLE DEVICE | Site: TOES | Status: NON-FUNCTIONAL
Brand: BABY GORILLA/GORILLA PLATING SYSTEM
Removed: 2021-11-03

## (undated) DEVICE — DRILL,  2.4 X 140MM, SOLID, MEASURING, LONG, AO: Brand: BABY GORILLA®/GORILLA® PLATING SYSTEM